# Patient Record
Sex: FEMALE | Race: OTHER
[De-identification: names, ages, dates, MRNs, and addresses within clinical notes are randomized per-mention and may not be internally consistent; named-entity substitution may affect disease eponyms.]

---

## 2020-05-17 ENCOUNTER — HOSPITAL ENCOUNTER (EMERGENCY)
Dept: HOSPITAL 72 - EMR | Age: 45
Discharge: HOME | End: 2020-05-17
Payer: SELF-PAY

## 2020-05-17 VITALS — SYSTOLIC BLOOD PRESSURE: 109 MMHG | DIASTOLIC BLOOD PRESSURE: 62 MMHG

## 2020-05-17 VITALS — SYSTOLIC BLOOD PRESSURE: 101 MMHG | DIASTOLIC BLOOD PRESSURE: 61 MMHG

## 2020-05-17 VITALS — HEIGHT: 65 IN | WEIGHT: 140 LBS | BODY MASS INDEX: 23.32 KG/M2

## 2020-05-17 VITALS — DIASTOLIC BLOOD PRESSURE: 61 MMHG | SYSTOLIC BLOOD PRESSURE: 101 MMHG

## 2020-05-17 VITALS — DIASTOLIC BLOOD PRESSURE: 74 MMHG | SYSTOLIC BLOOD PRESSURE: 128 MMHG

## 2020-05-17 DIAGNOSIS — R11.0: ICD-10-CM

## 2020-05-17 DIAGNOSIS — U07.1: Primary | ICD-10-CM

## 2020-05-17 DIAGNOSIS — R06.09: ICD-10-CM

## 2020-05-17 DIAGNOSIS — G44.89: ICD-10-CM

## 2020-05-17 LAB
ADD MANUAL DIFF: NO
ALBUMIN SERPL-MCNC: 3.7 G/DL (ref 3.4–5)
ALBUMIN/GLOB SERPL: 0.9 {RATIO} (ref 1–2.7)
ALP SERPL-CCNC: 65 U/L (ref 46–116)
ALT SERPL-CCNC: 21 U/L (ref 12–78)
ANION GAP SERPL CALC-SCNC: 8 MMOL/L (ref 5–15)
APPEARANCE UR: CLEAR
APTT BLD: 34 SEC (ref 23–33)
APTT PPP: (no result) S
AST SERPL-CCNC: 17 U/L (ref 15–37)
BASOPHILS NFR BLD AUTO: 0.5 % (ref 0–2)
BILIRUB SERPL-MCNC: 0.2 MG/DL (ref 0.2–1)
BUN SERPL-MCNC: 5 MG/DL (ref 7–18)
CALCIUM SERPL-MCNC: 9.7 MG/DL (ref 8.5–10.1)
CHLORIDE SERPL-SCNC: 105 MMOL/L (ref 98–107)
CK SERPL-CCNC: 29 U/L (ref 26–308)
CO2 SERPL-SCNC: 28 MMOL/L (ref 21–32)
CREAT SERPL-MCNC: 0.7 MG/DL (ref 0.55–1.3)
EOSINOPHIL NFR BLD AUTO: 0.4 % (ref 0–3)
ERYTHROCYTE [DISTWIDTH] IN BLOOD BY AUTOMATED COUNT: 11.5 % (ref 11.6–14.8)
FERRITIN SERPL-MCNC: 122 NG/ML (ref 8–388)
GLOBULIN SER-MCNC: 4.2 G/DL
GLUCOSE UR STRIP-MCNC: NEGATIVE MG/DL
HCT VFR BLD CALC: 38.8 % (ref 37–47)
HGB BLD-MCNC: 13.6 G/DL (ref 12–16)
INR PPP: 1 (ref 0.9–1.1)
KETONES UR QL STRIP: NEGATIVE
LDH SERPL L TO P-CCNC: 154 U/L (ref 81–234)
LEUKOCYTE ESTERASE UR QL STRIP: NEGATIVE
LYMPHOCYTES NFR BLD AUTO: 34.5 % (ref 20–45)
MCV RBC AUTO: 88 FL (ref 80–99)
MONOCYTES NFR BLD AUTO: 8.7 % (ref 1–10)
NEUTROPHILS NFR BLD AUTO: 55.9 % (ref 45–75)
NITRITE UR QL STRIP: NEGATIVE
PH UR STRIP: 6.5 [PH] (ref 4.5–8)
PLATELET # BLD: 165 K/UL (ref 150–450)
POTASSIUM SERPL-SCNC: 3.8 MMOL/L (ref 3.5–5.1)
PROT UR QL STRIP: NEGATIVE
RBC # BLD AUTO: 4.39 M/UL (ref 4.2–5.4)
SODIUM SERPL-SCNC: 141 MMOL/L (ref 136–145)
SP GR UR STRIP: 1 (ref 1–1.03)
UROBILINOGEN UR-MCNC: NORMAL MG/DL (ref 0–1)
WBC # BLD AUTO: 3.9 K/UL (ref 4.8–10.8)

## 2020-05-17 PROCEDURE — 84484 ASSAY OF TROPONIN QUANT: CPT

## 2020-05-17 PROCEDURE — 83615 LACTATE (LD) (LDH) ENZYME: CPT

## 2020-05-17 PROCEDURE — 82550 ASSAY OF CK (CPK): CPT

## 2020-05-17 PROCEDURE — 81003 URINALYSIS AUTO W/O SCOPE: CPT

## 2020-05-17 PROCEDURE — 85610 PROTHROMBIN TIME: CPT

## 2020-05-17 PROCEDURE — 96375 TX/PRO/DX INJ NEW DRUG ADDON: CPT

## 2020-05-17 PROCEDURE — 96374 THER/PROPH/DIAG INJ IV PUSH: CPT

## 2020-05-17 PROCEDURE — 71045 X-RAY EXAM CHEST 1 VIEW: CPT

## 2020-05-17 PROCEDURE — 82728 ASSAY OF FERRITIN: CPT

## 2020-05-17 PROCEDURE — 99284 EMERGENCY DEPT VISIT MOD MDM: CPT

## 2020-05-17 PROCEDURE — 96361 HYDRATE IV INFUSION ADD-ON: CPT

## 2020-05-17 PROCEDURE — 84703 CHORIONIC GONADOTROPIN ASSAY: CPT

## 2020-05-17 PROCEDURE — 83880 ASSAY OF NATRIURETIC PEPTIDE: CPT

## 2020-05-17 PROCEDURE — 86140 C-REACTIVE PROTEIN: CPT

## 2020-05-17 PROCEDURE — 83605 ASSAY OF LACTIC ACID: CPT

## 2020-05-17 PROCEDURE — 85025 COMPLETE CBC W/AUTO DIFF WBC: CPT

## 2020-05-17 PROCEDURE — 85730 THROMBOPLASTIN TIME PARTIAL: CPT

## 2020-05-17 PROCEDURE — 80053 COMPREHEN METABOLIC PANEL: CPT

## 2020-05-17 PROCEDURE — 36415 COLL VENOUS BLD VENIPUNCTURE: CPT

## 2020-05-17 NOTE — EMERGENCY ROOM REPORT
History of Present Illness


General


Chief Complaint:  Dyspnea/Respdistress


Source:  Patient





Present Illness


HPI


Patient presents with shortness of breath.  She tested COVID-19 positive on 

Thursday.  She has been ill since Monday or Tuesday with shortness of breath 

fevers headache and cough.  She took Tylenol yesterday.  She also feels 

nauseated and feels an emptiness in her stomach with some discomfort.  Today 

the shortness of breath became more severe.  In triage she was found to have O2 

sat of 90%.





The patient's been feeling dizzy when she stands up and feels somewhat 

dehydrated at this time.  The pain in her head at this time is 3/10.  

Throbbing.  It was improved by Tylenol yesterday.  She feels the headache is 

vague is related to nasal congestion.  She denies vomiting.  She has had loose 

stools yellow in color no blood.





The patient is menstruating at this time.  She does not believe she is 

pregnant.  She denies any dysuria.





No chills, abdominal pain, joint pain, rashes, depression, visual changes.


Allergies:  


Coded Allergies:  


     No Known Allergies (Unverified , 5/17/20)





COVID-19 Screening


Contact w/high risk pt:  Yes


Recent Travel to affected area:  No


Experienced COVID-19 symptoms?:  Yes


COVID-19 symptoms experienced:  Shortness of Breath


COVID-19 Testing performed PTA:  Yes


COVID-19 Screening:  Positive COVID-19





Patient History


Past Medical History:  see triage record


Social History:  Denies: smoking, alcohol use, drug use


Social History Narrative





Reviewed Nursing Documentation:  PMH: Agreed; PSxH: Agreed





Nursing Documentation-PMH


Past Medical History:  No History, Except For





Review of Systems


All Other Systems:  negative except mentioned in HPI





Physical Exam





Vital Signs








  Date Time  Temp Pulse Resp B/P (MAP) Pulse Ox O2 Delivery O2 Flow Rate FiO2


 


5/17/20 14:55 98.4 81 20 128/74 (92) 100 Room Air  








Sp02 EP Interpretation:  reviewed, abnormal - Triage pulse oximetry was 90% 

which is interpreted by me as low.  This is not documented in the initial vital 

sign


General Appearance:  well appearing, no apparent distress, GCS 15


Head:  normocephalic


Eyes:  bilateral eye normal inspection, bilateral eye PERRL, bilateral eye EOMI


ENT:  moist mucus membranes


Neck:  full range of motion, supple, no meningismus


Respiratory:  lungs clear, normal breath sounds


Cardiovascular #1:  regular rate, rhythm


Cardiovascular #2:  2+ radial (L)


Gastrointestinal:  normal inspection, normal bowel sounds, non tender, no mass, 

non-distended


Musculoskeletal:  back normal, normal range of motion, gait/station normal


Neurologic:  alert, motor strength/tone normal, CNs III-XII nml as tested, 

oriented x3, sensory intact, cerebellar normal, speech normal


Psychiatric:  mood/affect normal


Skin:  no rash, warm/dry





Medical Decision Making


Diagnostic Impression:  


 Primary Impression:  


 COVID-19 virus infection


 Additional Impressions:  


 Dyspnea


 Qualified Codes:  R06.09 - Other forms of dyspnea


 Headache


 Qualified Codes:  G44.89 - Other headache syndrome


 Nausea


ER Course


Patient with known COVID-19 positive status presents with dyspnea and initial 

oxygen saturation of 90%.  Differential includes hypoxia due to COVID-19, 

pulmonary embolus, pneumonia, bronchitis amongst others.  Evaluation with EKG, 

chest x-ray and labs.  Patient also clinically dehydrated and IV hydration will 

be administered.  In addition the patient will receive Zofran Tylenol and 

Pepcid.





On bring the patient back O2 saturation on room air is 99%.  Laboratory still 

in progress along with chest x-ray however EKG is canceled.





Chest x-ray clear.  Labs with low white count.  Wrist markers for COVID-19 low.

  Lactic acid normal.





Patient improved with treatment but still complaining of headache and nausea.  

Zofran repeated.





Discussed findings with patient.  No medical emergency at this time.  Patient 

stable for outpatient observation and treatment.





Laboratory Tests








Test


  5/17/20


14:30 5/17/20


17:30


 


White Blood Count


  3.9 K/UL


(4.8-10.8)  L 


 


 


Red Blood Count


  4.39 M/UL


(4.20-5.40) 


 


 


Hemoglobin


  13.6 G/DL


(12.0-16.0) 


 


 


Hematocrit


  38.8 %


(37.0-47.0) 


 


 


Mean Corpuscular Volume 88 FL (80-99)   


 


Mean Corpuscular Hemoglobin


  30.9 PG


(27.0-31.0) 


 


 


Mean Corpuscular Hemoglobin


Concent 35.0 G/DL


(32.0-36.0) 


 


 


Red Cell Distribution Width


  11.5 %


(11.6-14.8)  L 


 


 


Platelet Count


  165 K/UL


(150-450) 


 


 


Mean Platelet Volume


  7.4 FL


(6.5-10.1) 


 


 


Neutrophils (%) (Auto)


  55.9 %


(45.0-75.0) 


 


 


Lymphocytes (%) (Auto)


  34.5 %


(20.0-45.0) 


 


 


Monocytes (%) (Auto)


  8.7 %


(1.0-10.0) 


 


 


Eosinophils (%) (Auto)


  0.4 %


(0.0-3.0) 


 


 


Basophils (%) (Auto)


  0.5 %


(0.0-2.0) 


 


 


Prothrombin Time


  11.0 SEC


(9.30-11.50) 


 


 


Prothrombin Time INR 1.0 (0.9-1.1)   


 


Activated Partial


Thromboplast Time 34 SEC (23-33)


H 


 


 


Sodium Level


  141 MMOL/L


(136-145) 


 


 


Potassium Level


  3.8 MMOL/L


(3.5-5.1) 


 


 


Chloride Level


  105 MMOL/L


() 


 


 


Carbon Dioxide Level


  28 MMOL/L


(21-32) 


 


 


Anion Gap


  8 mmol/L


(5-15) 


 


 


Blood Urea Nitrogen


  5 mg/dL (7-18)


L 


 


 


Creatinine


  0.7 MG/DL


(0.55-1.30) 


 


 


Estimated Glomerular


Filtration Rate > 60 mL/min


(>60) 


 


 


Glucose Level


  123 MG/DL


()  H 


 


 


Lactic Acid Level


  1.30 mmol/L


(0.4-2.0) 


 


 


Calcium Level


  9.7 MG/DL


(8.5-10.1) 


 


 


Ferritin


  122 NG/ML


(8-388) 


 


 


Total Bilirubin


  0.2 MG/DL


(0.2-1.0) 


 


 


Aspartate Amino Transferase


(AST) 17 U/L (15-37)


  


 


 


Alanine Aminotransferase (ALT)


  21 U/L (12-78)


  


 


 


Alkaline Phosphatase


  65 U/L


() 


 


 


Lactate Dehydrogenase


  154 U/L


() 


 


 


Total Creatine Kinase


  29 U/L


() 


 


 


Troponin I


  0.000 ng/mL


(0.000-0.056) 


 


 


C-Reactive Protein,


Quantitative 1.8 mg/dL


(0.00-0.90)  H 


 


 


Pro-B-Type Natriuretic Peptide


  21 pg/mL


(0-125) 


 


 


Total Protein


  7.9 G/DL


(6.4-8.2) 


 


 


Albumin


  3.7 G/DL


(3.4-5.0) 


 


 


Globulin 4.2 g/dL   


 


Albumin/Globulin Ratio


  0.9 (1.0-2.7)


L 


 


 


Human Chorionic Gonadotropin,


Qual Negative


(NEGATIVE) 


 


 


Urine Color  Pale yellow  


 


Urine Appearance  Clear  


 


Urine pH  6.5 (4.5-8.0)  


 


Urine Specific Gravity


  


  1.005


(1.005-1.035)


 


Urine Protein


  


  Negative


(NEGATIVE)


 


Urine Glucose (UA)


  


  Negative


(NEGATIVE)


 


Urine Ketones


  


  Negative


(NEGATIVE)


 


Urine Blood


  


  1+ (NEGATIVE)


H


 


Urine Nitrite


  


  Negative


(NEGATIVE)


 


Urine Bilirubin


  


  Negative


(NEGATIVE)


 


Urine Urobilinogen


  


  Normal MG/DL


(0.0-1.0)


 


Urine Leukocyte Esterase


  


  Negative


(NEGATIVE)


 


Urine RBC


  


  5-10 /HPF (0 -


2)  H


 


Urine WBC


  


  0-2 /HPF (0 -


2)


 


Urine Squamous Epithelial


Cells 


  Moderate /LPF


(NONE/OCC)  H


 


Urine Bacteria


  


  Few /HPF


(NONE)








EKG Diagnostic Results


Rate:  normal


Rhythm:  NSR


ST Segments:  no acute changes





Rhythm Strip Diag. Results


EP Interpretation:  yes


Rhythm:  NSR, no PVC's, no ectopy





Chest X-Ray Diagnostic Results


Chest X-Ray Diagnostic Results :  


   Chest X-Ray Ordered:  Yes


   # of Views/Limited/Complete:  1 View


   Indication:  Shortness of Breath


   EP Interpretation:  Yes


   Interpretation:  no consolidation, no effusion, no pneumothorax


   Impression:  No acute disease


   Electronically Signed by:  Electronically signed by Jose Mireles MD





Last Vital Signs








  Date Time  Temp Pulse Resp B/P (MAP) Pulse Ox O2 Delivery O2 Flow Rate FiO2


 


5/17/20 18:26 98.4 71 18 101/61 100 Room Air  








Status:  improved


Disposition:  HOME, SELF-CARE


Condition:  Improved


Scripts


Chlorpheniramine Maleate (CHLOR-TRIMETON) 4 Mg Tablet


4 MG PO Q6HR PRN for congestion, #10 TAB


   Prov: Jose Mireles MD         5/17/20 


Famotidine* (Pepcid 20mg tablet*) 20 Mg Tablet


20 MG ORAL DAILY, #15 TAB 0 Refills


   Prov: Jose Mireles MD         5/17/20 


Ondansetron Odt* (ZOFRAN ODT*) 4 Mg Tab.rapdis


4 MG BC EVERY 8 HOURS, #10 TAB 0 Refills


   Prov: Jose Mireles MD         5/17/20 


Acetaminophen (Tylenol) 325 Mg Tablet


650 MG ORAL Q6H PRN for Prn Pain/Headache/Temp > 101, #20 TAB 0 Refills


   Prov: Jose Mireles MD         5/17/20


Referrals:  


NOT CHOSEN [facility]/MD,REFERRING (PCP)











Jose Mireles MD May 17, 2020 15:18

## 2020-05-17 NOTE — DIAGNOSTIC IMAGING REPORT
EXAM:

  XR Chest, 1 View

 

CLINICAL HISTORY:

  DYSPNEA

 

TECHNIQUE:

  Frontal view of the chest.

 

COMPARISON:

  No relevant prior studies available.

 

FINDINGS:

  Lungs: The lateral pulmonary hyperinflation.  No consolidation.

  Pleural space:  No significant abnormality.  No pneumothorax.

  Heart:  No significant abnormality.  No cardiomegaly.

  Mediastinum:  No significant abnormality.

  Bones/joints:  No acute osseous abnormality.

 

IMPRESSION:     

  No acute cardiopulmonary process.

## 2020-05-17 NOTE — NUR
ED Nurse Note:



Pt cleared by health care Provider for discharge.  DC instructions/prescription 
was given and explained to pt and verbalized understanding of teachings. All 
medical deviecs such as ID band and IV removed. Pt is AAO x4, ambulatory and 
left with all personal belongings. NAD noted.

## 2020-05-17 NOTE — NUR
ED Nurse Note:



ambulated in to ED from home due to increased SOB with N/V. Per pt, she was 
tested positive last Thursday and her  is currently in the hospital for 
COVID. Pt able to answer questions in full sentences without any distress. 
Breathing normal/even/unlabored. skin warm/dry/intact. NAD noted.